# Patient Record
Sex: MALE | URBAN - METROPOLITAN AREA
[De-identification: names, ages, dates, MRNs, and addresses within clinical notes are randomized per-mention and may not be internally consistent; named-entity substitution may affect disease eponyms.]

---

## 2021-05-10 ENCOUNTER — OFFICE VISIT (OUTPATIENT)
Dept: URBAN - METROPOLITAN AREA CLINIC 56 | Facility: CLINIC | Age: 61
End: 2021-05-10
Payer: COMMERCIAL

## 2021-05-10 DIAGNOSIS — H25.11 AGE-RELATED NUCLEAR CATARACT, RIGHT EYE: ICD-10-CM

## 2021-05-10 DIAGNOSIS — H25.811 COMBINED FORMS OF AGE-RELATED CATARACT, RIGHT EYE: ICD-10-CM

## 2021-05-10 DIAGNOSIS — H43.812 VITREOUS DEGENERATION, LEFT EYE: Primary | ICD-10-CM

## 2021-05-10 PROCEDURE — 92134 CPTRZ OPH DX IMG PST SGM RTA: CPT | Performed by: OPTOMETRIST

## 2021-05-10 PROCEDURE — 92004 COMPRE OPH EXAM NEW PT 1/>: CPT | Performed by: OPTOMETRIST

## 2021-05-10 ASSESSMENT — INTRAOCULAR PRESSURE
OD: 16
OS: 15

## 2021-05-10 ASSESSMENT — KERATOMETRY
OD: 44.80
OS: 44.45

## 2021-05-10 ASSESSMENT — VISUAL ACUITY
OS: 20/20
OD: 20/20

## 2021-05-10 NOTE — IMPRESSION/PLAN
Impression: Vitreous degeneration, left eye: H43.812. Plan: The patient has no evidence of a retinal tear of detachment in either eye on 360 dilated retina examination. There was suggestion of an evolving PVD based on the vitreous syneresis seen in the anterior vitreous and symptoms consistent with separation of the vitreous from the retina. The patient was instructed to monitor symptoms closely. The patient was instructed to specifically watch for an increase in amount of severity of flashes and floaters, or the development of new curtains in the periphery of vision.  Risk of tear remains 1-2% after normal retinal exam.